# Patient Record
(demographics unavailable — no encounter records)

---

## 2025-04-03 NOTE — HISTORY OF PRESENT ILLNESS
[FreeTextEntry1] :  Subjective:   - Summary: Nona Zapata is a new patient presenting with painful right third interspace, status post-fracturing right fourth toe in December, and painful left first interspace. She has a history of well-controlled hypertension.   - Chief Complaint (CC): Painful right third interspace, status post-fracturing right fourth toe, and painful left first interspace   - History of Present Illness (HPI): Patient presents with pain in the right third interspace following a fracture of the right fourth toe in December. She also reports pain in the left first interspace. The onset and duration of symptoms are not specified. The patient's right fourth toe appears slightly edematous, indicating possible residual effects from the fracture.   - Past Medical History:     - Hypertension (well-controlled)   - Allergies:     - No known allergies   Objective:   - Diagnostic Results:     - X-rays (3 views, bilateral): Healed fracture of the proximal phalanx of the right fourth toe     - Right foot: Third and fourth metatarsals very close, possibly contributing to Jones's neuroma     - Left foot: Hallux valgus deformity, incidental finding of asymptomatic heel spur     - Bilateral: Hammered digits two through four   - Vital Signs:     - DP: 2/4 bilateral     - PT: 1/4 bilateral     - Temperature gradient : Within normal limits B/L     - Capillary refill: 1 second x 10   - Physical Examination (PE):     - Hallux valgus deformity of the left foot     - Hammer toes two through four of bilateral feet     - Neurovascular status intact bilaterally     - Skin well hydrated with good turgor     - Right fourth toe slightly edematous.     - Pain on palpation of right fourth interspace     - Pain on palpation of left first interspace   Assessment:   - Summary: The patient presents with multiple foot issues including a healed fracture, possible neuromas, and structural deformities.   - Problems:     - Healed fracture, right fourth toe     - Jones's neuroma, right foot     - Hallux abducto valgus, left foot     - Hammer toes two through four, bilateral     - Neuroma, left first interspace      Summary: The treatment plan includes diagnostic imaging and targeted injections to address the patient's foot pain and structural issues.   - Plan:     - Perform X-ray examination, 3 views bilateral     - Inject right fourth interspace with 1.0 cc of mixture (2% lidocaine plain, cyanocobalamin, and Kenalog 10)     - Inject left first interspace with 1.0 cc of mixture (2% lidocaine plain, cyanocobalamin, and Kenalog 10)     - Schedule follow-up appointment in 3 weeks

## 2025-04-03 NOTE — PROCEDURE
[FreeTextEntry1] : Xray 3 views B/L shows healed fracture right fourth toe. HAV left.  Hammer toes 2-4 B/L. Inject left  interspace  and right third interspace with 1.0 cc each of: Kenalog-10/Cyanocobalimin/2% Lidocaine plain.

## 2025-04-23 NOTE — PROCEDURE
[FreeTextEntry1] : Inject left third interspace with 0.5 cc each of: Cyanocobalamin/Kenalog-10/2% Lidocaine plain.

## 2025-04-23 NOTE — HISTORY OF PRESENT ILLNESS
[FreeTextEntry1] :  Subjective:   -Patient presents with chief complaint of painful left third interspace times several weeks and increasing in severity.  No history of trauma.  Objective:   -     - DP (Dorsalis Pedis pulse): 2/4 bilaterally     - PT (Posterior Tibial pulse): 1/4 bilaterally     - Temperature gradient is WNL B/L/ CR: 2 seconds times ten.   - Physical Examination (PE):     - Integumentary System: Skin is well hydrated with good turgor         Musculoskeletal System :           -  Findings : Presence of Hammer toes (digits 2 through 5) which are asymptomatic, and there are asymptomatic hallux valgus deformities bilaterally.           -  Pain Assessment : Severe pain upon palpation of the left third intermetatarsal space, exacerbated by medial and lateral compression.     - Neurovascular assessment: Intact bilaterally   Assessment:   - Summary: Patient has bilateral hallux valgus, hammer toes (digits 2 through 5) bilaterally, and Jones's neuroma in the left third intermetatarsal space.   - Problems:     - Hallux valgus bilaterally     - Hammer toes (digits 2 through 5) bilaterally     - Jones's neuroma in the left third interspace    - Plan: -Exam     - Inject left third intermetatarsal space with 0.5 cc each of 2% lidocaine plain, Kenalog-10, and, cyanocobalamin.     - Schedule a follow-up appointment in three weeks

## 2025-04-24 NOTE — PLAN
[FreeTextEntry1] : blood pressure repeated manually 160/105.Patient advised to take her blood pressure medication and see her primary care physician Dr Yoo

## 2025-05-14 NOTE — HISTORY OF PRESENT ILLNESS
[FreeTextEntry1] :  Subjective:   - Summary: Patient presents for follow-up of neuroma in left third interspace, reporting improvement. New complaints of pain in left first metatarsophalangeal joint (MPJ) and right second interspace.   - Chief Complaint (CC): Follow-up for neuroma pain and new foot pain   - History of Present Illness (HPI): Patient reports improvement in the previously diagnosed neuroma in the left third interspace. New complaints include pain in the left first metatarsophalangeal joint (MPJ) and right second interspace. No changes in past medical history reported.        Objective:   - Diagnostic Results:    - Vital Signs:    - Physical Examination (PE): Dorsalis pedis pulse: 2/4 bilaterally. Posterior tibial pulse: 1/4 bilaterally. TG: WNL B/L; 10. Pain upon palpation of the left first MPJ where there was a hallux abducto valgus. Pain upon medial and lateral compression and palpation of the right second interspace, consistent with digital neuroma. Neurovascular status intact bilaterally. Skin well-hydrated with good turgor.   Assessment:   - Summary: Patient presents with improved neuroma pain in left third interspace, but new pain in left first MPJ and right second interspace.   - Problems:     - Hallux Abducto Valgus (HAV) with bursitis, left first MPJ     - Neuroma, right second interspace     - Resolving neuroma, left third interspace:         - Plan:  -Exam.     - Inject left first MPJ and right second interspace with 1.5 cc each of dexamethasone phosphate and 2% lidocaine plain     - Schedule follow-up appointment in three weeks.

## 2025-05-14 NOTE — PROCEDURE
[FreeTextEntry1] : Inject left first MPJ and right 2nd interspace with 1.5 cc each of Dexamethasone Phosphate and 2% Lidoaine plain.

## 2025-06-04 NOTE — HISTORY OF PRESENT ILLNESS
[FreeTextEntry1] :  Subjective:   - Summary: Patient presents for follow-up care. Left bunion pain has resolved. No current complaints. There are thickened, elongated, painful, onychomycotic, onychogryphotic, dystrophic toenails times ten, and left Jones's neuroma pain and right second interspace digital neuroma pain.   -   Objective:   - Diagnostic Results:    - Vital Signs:    - Physical Examination (PE): Dorsalis pedis pulse: 2/4 bilateral, Posterior tibial pulse: 2/4 bilateral, Capillary return: 2 seconds times ten. TG: WNL B/L. There are thickened, elongated, painful, onychomycotic, onychogryphotic, dystrophic toenails times ten. There is a painful left Jones's neuroma and right second interspace painful digital neuroma.  Left bunion pain has subsided.   Assessment:   - Summary: Patient presents with multiple foot conditions including onychomycosis, onychogryposis, left Jones's neuroma, and right digital neuroma in the second interspace.   - Problems:     - Onychomycosis     - Onychogryphosis     - Jones's neuroma (left foot)     - Digital neuroma (right foot, second interspace)      Plan:   - Summary: Treatment plan includes nail care and steroid injections for neuromas. Patient to return for follow-up in three weeks.   - Plan: -Exam.     - Perform aseptic mechanical debridement of thickened, elongated, painful, onychomycotic, onychogryphotic, dystrophic toenails times ten.     - Inject left third interspace and right second interspace with 1.5 cc each of dexamethasone phosphate and 2% lidocaine plain.     - Schedule follow-up appointment in 3 weeks.

## 2025-06-04 NOTE — PROCEDURE
[FreeTextEntry1] :  Aseptic mechanical debridement of thickened, elongated, dystrophic, painful mycotic toenails times ten. Inject left third interspace and right second interspace with a total of 1.5 cc's each of dexamethasone phosphate and 2% lidocaine plain.

## 2025-06-25 NOTE — HISTORY OF PRESENT ILLNESS
[FreeTextEntry1] :  Subjective:   - Summary: Patient reports improvement in pain following injections, with approximately 70% resolution.   - Chief Complaint (CC): Follow-up for bilateral foot neuromas   - History of Present Illness (HPI): Patient has a history of left third interspace Jones's neuroma and right second interspace digital neuroma. The patient reports that injections have definitely improved the condition, with pain approximately 75% resolved.     Objective:   - Diagnostic Results:    - Vital Signs:    - Physical Examination (PE): Dorsalis pedis pulse: 2/4 bilateral, Posterior tibial pulse: 1/4 bilateral. Temperature gradient is within normal limits bilaterally. Capillary refill return is 1 second x 10. Pain on palpation and medial lateral compression of the left third interspace and the right second interspace, consistent with Jones's neuroma on the left and digital neuroma on the right. Neurovascular status is intact bilaterally. The skin is well hydrated with good turgor.   Assessment:   - Summary: Patient presents with bilateral foot neuromas showing improvement following previous injections.   - Problems:     - Jones's neuroma, left third interspace     - Digital neuroma, right second interspace       - Plan: -Exam.     - Inject left third interspace and right second interspace with a total of 1.5 cc each of Dexamethasone phosphate and 2% lidocaine plain.     - Schedule return office visit in three weeks for follow-up.

## 2025-06-25 NOTE — PROCEDURE
[FreeTextEntry1] : Inject left third interspace and right second interspace with 1.5 cc's each of Dexamethasone Phosphate and 2% Lidocaine plain.

## 2025-07-16 NOTE — HISTORY OF PRESENT ILLNESS
[FreeTextEntry1] :  Subjective:   - Summary: Patient is following up for bilateral Jones's neuroma. The right side has completely resolved, while the left side is approximately 70% resolved.   - Chief Complaint (CC): Follow-up for Jones's neuroma   - History of Present Illness (HPI): Oliver Jones is following up for bilateral Jones's neuroma. The patient reports that the right third interspace neuroma has totally resolved. The left third interspace neuroma is approximately 70% resolved.   Objective:   - Diagnostic Results:    - Vital Signs     -DP:  2/4 bilateral     - PT: 1/4 bilateral     - Temperature gradient WNL B/l.     - Capillary refill: 2 seconds time 10.   - Physical Examination (PE): Negative pain on palpation of right second interspace. Positive pain on palpation of left third interspace consistent with Jones's neuroma.   Assessment:   - Summary: Patient has a resolved digital neuroma on the right foot and an improving Jones's neuroma in the left third interspace.   - Problems:     - Resolved digital neuroma, right foot     - Jones's neuroma, left third interspace (70% resolved)     Plan: -Exam.     - Inject left third interspace with 1.0 cc of 2% lidocaine plain, cyanocobalamin, and kenalog-10.     - Follow-up appointment in two weeks.     - Reassess progress and response to injection at next visit.

## 2025-07-16 NOTE — PROCEDURE
[FreeTextEntry1] : Inject right third interspace with 1.0 cc each of: Kenalog-10/2% Lidocaine plain/Cyanocobalamin.